# Patient Record
Sex: MALE | Race: WHITE | HISPANIC OR LATINO | URBAN - NONMETROPOLITAN AREA
[De-identification: names, ages, dates, MRNs, and addresses within clinical notes are randomized per-mention and may not be internally consistent; named-entity substitution may affect disease eponyms.]

---
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
## 2024-08-07 ENCOUNTER — OFFICE VISIT (OUTPATIENT)
Dept: URGENT CARE | Facility: CLINIC | Age: 19
End: 2024-08-07
Payer: COMMERCIAL

## 2024-08-07 VITALS
HEART RATE: 83 BPM | WEIGHT: 102.4 LBS | DIASTOLIC BLOOD PRESSURE: 70 MMHG | OXYGEN SATURATION: 94 % | TEMPERATURE: 97.6 F | SYSTOLIC BLOOD PRESSURE: 118 MMHG | RESPIRATION RATE: 14 BRPM | BODY MASS INDEX: 13.87 KG/M2 | HEIGHT: 72 IN

## 2024-08-07 DIAGNOSIS — R22.42 LEG MASS, LEFT: ICD-10-CM

## 2024-08-07 PROCEDURE — 3078F DIAST BP <80 MM HG: CPT | Performed by: PHYSICIAN ASSISTANT

## 2024-08-07 PROCEDURE — 99213 OFFICE O/P EST LOW 20 MIN: CPT | Performed by: PHYSICIAN ASSISTANT

## 2024-08-07 PROCEDURE — 3074F SYST BP LT 130 MM HG: CPT | Performed by: PHYSICIAN ASSISTANT

## 2024-08-07 ASSESSMENT — ENCOUNTER SYMPTOMS
TINGLING: 0
VOMITING: 0
SHORTNESS OF BREATH: 0
FEVER: 0
NAUSEA: 0
WEAKNESS: 0
CHILLS: 0
MYALGIAS: 1
FOCAL WEAKNESS: 0
COUGH: 0
SENSORY CHANGE: 0

## 2024-08-07 ASSESSMENT — FIBROSIS 4 INDEX: FIB4 SCORE: 0.69

## 2024-08-07 NOTE — LETTER
August 7, 2024         Patient: David Garcia   YOB: 2005   Date of Visit: 8/7/2024           To Whom it May Concern:    David Garcia was seen in my clinic on 8/7/2024.     If you have any questions or concerns, please don't hesitate to call.        Sincerely,           Alisha Carvajal P.A.-C.  Electronically Signed

## 2024-08-08 ENCOUNTER — HOSPITAL ENCOUNTER (OUTPATIENT)
Dept: RADIOLOGY | Facility: MEDICAL CENTER | Age: 19
End: 2024-08-08
Attending: PHYSICIAN ASSISTANT
Payer: COMMERCIAL

## 2024-08-08 DIAGNOSIS — R22.42 LEG MASS, LEFT: ICD-10-CM

## 2024-08-08 PROCEDURE — 93971 EXTREMITY STUDY: CPT | Mod: LT

## 2024-08-08 PROCEDURE — 93971 EXTREMITY STUDY: CPT | Mod: 26,LT | Performed by: INTERNAL MEDICINE

## 2024-08-08 NOTE — PROGRESS NOTES
Subjective     David Garcia is a 19 y.o. male who presents with Bump (L leg, calf, tender to the touch, blood blisters on both legs x 1.5 week )            Patient is here accompanied by mother. Patient is complaining of a pain is his left calf x 1.5 weeks. No trauma to the area. No recent strenuous exercise. Patient feels a lump in the area. No recent surgery or prolonged immobilization. OTC analgesics are not helping with the pain per patient.    No past medical history on file.    No past surgical history on file.    No family history on file.    Allergies:  Penicillins and Amoxicillin-pot clavulanate    Medications, Allergies, and current problem list reviewed today in Epic    Review of Systems   Constitutional:  Negative for chills, fever and malaise/fatigue.   Respiratory:  Negative for cough and shortness of breath.    Cardiovascular:  Negative for chest pain and leg swelling.   Gastrointestinal:  Negative for nausea and vomiting.   Musculoskeletal:  Positive for myalgias (left calf- lump felt).   Neurological:  Negative for tingling, sensory change, focal weakness and weakness.     All other systems reviewed and are negative.            Objective     /70 (BP Location: Left arm, Patient Position: Sitting, BP Cuff Size: Adult long)   Pulse 83   Temp 36.4 °C (97.6 °F) (Temporal)   Resp 14   Ht 1.829 m (6')   Wt 46.4 kg (102 lb 6.4 oz)   SpO2 94%   BMI 13.89 kg/m²      Physical Exam  Constitutional:       General: He is not in acute distress.     Appearance: He is not ill-appearing.   HENT:      Head: Normocephalic and atraumatic.   Eyes:      Conjunctiva/sclera: Conjunctivae normal.   Cardiovascular:      Rate and Rhythm: Normal rate and regular rhythm.   Pulmonary:      Effort: Pulmonary effort is normal. No respiratory distress.      Breath sounds: No stridor. No wheezing.   Musculoskeletal:        Legs:    Skin:     General: Skin is warm and dry.   Neurological:      General: No focal deficit  present.      Mental Status: He is alert and oriented to person, place, and time.   Psychiatric:         Mood and Affect: Mood normal.         Behavior: Behavior normal.         Thought Content: Thought content normal.         Judgment: Judgment normal.               Vascular Laboratory   CONCLUSIONS   Normal left lower extremity deep venous examination.       BARON GUNN      Exam Date:     2024 14:49      Room #:     Out Patient      Priority:     Stat      Ht (in):             Wt (lb):      Ordering Physician:        STEVEN MELTON      Referring Physician:       GERONIMO Diaz      Sonographer:               Treasure Danielle RVSIMI      Study Type:                Complete Unilateral      Technical Quality:         Adequate      Age:    19    Gender:     M      MRN:    [id number]      :    2005      BSA:      Indications:     Localized swelling, mass and lump, left lower limb, Pain in                     left lower leg      CPT Codes:       18818      ICD Codes:       R22.42  M79.662      History:         Pain and swelling in the left medial calf. No prior study.      Limitations:      PROCEDURES:   Left lower extremity venous duplex imaging.    The following venous structures were evaluated: common femoral, deep    femoral, proximal great saphenous, femoral, popliteal, peroneal, and    posterior tibial veins.    Serial compression, color, and spectral Doppler flow evaluations were    performed.       FINDINGS:   Left lower extremity.    All veins demonstrate complete color filling and compressibility with    normal venous flow dynamics including spontaneous flow and respiratory    phasicity.    No deep venous thrombosis.    No other abnormalities are seen in the left lower extremity.      Flow was evaluated in the contralateral common femoral vein and normal    venous flow dynamics including spontaneous flow and respiratory phasic    variation were demonstrated.                 Assessment & Plan         1. Leg mass, left    - US-EXTREMITY VENOUS LOWER UNILAT LEFT; Future     US negative. No abnormalities found  Likely small area of inflammation of calf muscle.    OTC analgesics, heat.     Differential diagnoses, Supportive care, and indications for immediate follow-up discussed with patient.   Pathogenesis of diagnosis discussed including typical length and natural progression.   Instructed to return to clinic or nearest emergency department for any change in condition, further concerns, or worsening of symptoms.      .The patient demonstrated a good understanding and agreed with the treatment plan.    Alisha Carvajal P.A.-C.

## 2024-08-17 ENCOUNTER — OFFICE VISIT (OUTPATIENT)
Dept: URGENT CARE | Facility: CLINIC | Age: 19
End: 2024-08-17
Payer: COMMERCIAL

## 2024-08-17 VITALS
HEART RATE: 96 BPM | WEIGHT: 102 LBS | OXYGEN SATURATION: 100 % | DIASTOLIC BLOOD PRESSURE: 72 MMHG | SYSTOLIC BLOOD PRESSURE: 110 MMHG | RESPIRATION RATE: 14 BRPM | TEMPERATURE: 98.1 F | BODY MASS INDEX: 13.82 KG/M2 | HEIGHT: 72 IN

## 2024-08-17 DIAGNOSIS — R22.42 LOCALIZED SWELLING OF LEFT LOWER EXTREMITY: ICD-10-CM

## 2024-08-17 PROCEDURE — 3074F SYST BP LT 130 MM HG: CPT

## 2024-08-17 PROCEDURE — 3078F DIAST BP <80 MM HG: CPT

## 2024-08-17 PROCEDURE — 99213 OFFICE O/P EST LOW 20 MIN: CPT

## 2024-08-17 ASSESSMENT — ENCOUNTER SYMPTOMS
CHILLS: 0
SENSORY CHANGE: 0
WEAKNESS: 0
SHORTNESS OF BREATH: 0
VOMITING: 0
FEVER: 0
NAUSEA: 0
ABDOMINAL PAIN: 0
FALLS: 0
COUGH: 0
TINGLING: 0
BACK PAIN: 0

## 2024-08-17 ASSESSMENT — FIBROSIS 4 INDEX: FIB4 SCORE: 0.69

## 2024-08-17 NOTE — PROGRESS NOTES
Chief Complaint   Patient presents with    Leg Pain     L leg, originally felt a lump, bruising, can not put weight on leg x 8/7/24       HISTORY OF PRESENT ILLNESS: Patient is a pleasant 19 y.o. male who presents to urgent care today comes in with ongoing swelling and bruising noted especially behind the knee on the left lower extremity, he was seen here approximately 1 week ago, he did have an ultrasound of this leg, no DVT was noted at this time.  Patient states he does have a history of elevated liver enzymes, he is not currently established with a primary care provider.  He denies any symptoms related otherwise.    Patient Active Problem List    Diagnosis Date Noted    Elevated liver enzymes 06/20/2023       Allergies:Penicillins and Amoxicillin-pot clavulanate    Current Outpatient Medications Ordered in Epic   Medication Sig Dispense Refill    lidocaine (XYLOCAINE) 2 % Solution Take 5 mL by mouth 3 times a day as needed for Throat/Mouth Pain. (Patient not taking: Reported on 8/7/2024) 100 mL 0    chlorhexidine (PERIDEX) 0.12 % Solution Take 15 mL by mouth 2 times a day. (Patient not taking: Reported on 8/7/2024) 118 mL 0     No current Epic-ordered facility-administered medications on file.       History reviewed. No pertinent past medical history.    Social History     Tobacco Use    Smoking status: Never    Smokeless tobacco: Never   Vaping Use    Vaping status: Never Used   Substance Use Topics    Alcohol use: Never    Drug use: Never       No family status information on file.   History reviewed. No pertinent family history.    Review of Systems   Constitutional:  Negative for chills, fever and malaise/fatigue.   Respiratory:  Negative for cough and shortness of breath.    Cardiovascular:  Positive for leg swelling. Negative for chest pain.   Gastrointestinal:  Negative for abdominal pain, nausea and vomiting.   Genitourinary:  Negative for dysuria, frequency and urgency.   Musculoskeletal:  Negative for  back pain and falls.   Neurological:  Negative for tingling, sensory change and weakness.       Exam:  /72 (BP Location: Right arm, Patient Position: Sitting, BP Cuff Size: Small adult)   Pulse 96   Temp 36.7 °C (98.1 °F) (Temporal)   Resp 14   Ht 1.829 m (6')   Wt 46.3 kg (102 lb)   SpO2 100%   Physical Exam  Vitals reviewed.   Constitutional:       Appearance: Normal appearance.      Comments: Patient is extremely skinny, almost cachectic looking, per mom and patient this is normal   HENT:      Head: Normocephalic.      Right Ear: Tympanic membrane is not injected or erythematous.      Left Ear: Tympanic membrane is not injected or erythematous.      Mouth/Throat:      Mouth: Mucous membranes are moist.      Dentition: Abnormal dentition. Dental caries present.      Pharynx: Oropharynx is clear. No oropharyngeal exudate.      Tonsils: No tonsillar exudate. 0 on the right. 0 on the left.   Eyes:      General:         Right eye: No discharge.         Left eye: No discharge.      Extraocular Movements: Extraocular movements intact.      Conjunctiva/sclera: Conjunctivae normal.      Pupils: Pupils are equal, round, and reactive to light.   Cardiovascular:      Rate and Rhythm: Normal rate and regular rhythm.      Pulses: Normal pulses.      Heart sounds: Normal heart sounds. No murmur heard.  Pulmonary:      Effort: Pulmonary effort is normal. No respiratory distress.      Breath sounds: Normal breath sounds. No stridor. No wheezing.   Musculoskeletal:         General: Swelling and tenderness present. Normal range of motion.      Cervical back: Normal range of motion.      Comments: Left lower extremity swelling, he does have a noted bruise behind his left knee as well   Lymphadenopathy:      Cervical: No cervical adenopathy.   Skin:     General: Skin is warm and dry.      Capillary Refill: Capillary refill takes less than 2 seconds.      Findings: No bruising or rash.   Neurological:      General: No  focal deficit present.      Mental Status: He is alert.      Sensory: No sensory deficit.      Motor: No weakness.   Psychiatric:         Mood and Affect: Mood normal.         Behavior: Behavior normal.         Thought Content: Thought content normal.         Judgment: Judgment normal.             Assessment/Plan:  1. Localized swelling of left lower extremity    Based on physical exam along with review of systems I do suspect a Baker's cyst, especially given the nature of the ultrasound which was completed on the eighth and negative for DVT at this time.  Patient does have bruising noted behind his leg as well as swelling.  Encouraged elevation, compression and ice.  Motrin and Tylenol for any pain although I did tell him to limit his Tylenol given his history of elevated liver enzymes.  Patient is quite cachectic here in the office today, very skinny, per mom this is normal and he has been like this since he was a child.  Patient has noted dental caries and several decayed teeth especially his friends.  I was slightly concerned and voiced concern throughout the entire appointment, encouraged to make an appointment with his primary care provider, he does need to be established and should have ongoing monitoring of his liver.  Mom verbalized understanding, I did offer referral, they declined at this time.    Supportive care, differential diagnoses, and indications for immediate follow-up discussed with patient.   Pathogenesis of diagnosis discussed including typical length and natural progression.   Instructed to return to clinic or nearest emergency department for any change in condition, further concerns, or worsening of symptoms.  Patient states understanding of the plan of care and discharge instructions.  Instructed to make an appointment, for follow up, with primary care provider.    Please note that this dictation was created using voice recognition software. I have made every reasonable attempt to correct  obvious errors, but I expect that there are errors of grammar and possibly content that I did not discover before finalizing the note.      Allison WILKINSON

## 2024-08-17 NOTE — LETTER
[facility]  [address] [id number]     August 17, 2024    Patient: David Garcia   YOB: 2005   Date of Visit: 8/17/2024       To Whom It May Concern:    David Garcia was seen and treated in our department on 8/17/2024. Please excuse from work the 20th thru the 24th.      Sincerely,     JOSH Ferris.